# Patient Record
Sex: MALE | Race: BLACK OR AFRICAN AMERICAN | Employment: FULL TIME | URBAN - NONMETROPOLITAN AREA
[De-identification: names, ages, dates, MRNs, and addresses within clinical notes are randomized per-mention and may not be internally consistent; named-entity substitution may affect disease eponyms.]

---

## 2022-01-01 ENCOUNTER — HOSPITAL ENCOUNTER (EMERGENCY)
Age: 42
End: 2022-08-26
Attending: EMERGENCY MEDICINE
Payer: COMMERCIAL

## 2022-01-01 VITALS — DIASTOLIC BLOOD PRESSURE: 39 MMHG | RESPIRATION RATE: 13 BRPM | SYSTOLIC BLOOD PRESSURE: 95 MMHG | HEART RATE: 110 BPM

## 2022-01-01 DIAGNOSIS — I46.9 CARDIAC ARREST (HCC): Primary | ICD-10-CM

## 2022-01-01 PROCEDURE — 74011000250 HC RX REV CODE- 250: Performed by: EMERGENCY MEDICINE

## 2022-01-01 PROCEDURE — 74011250636 HC RX REV CODE- 250/636: Performed by: EMERGENCY MEDICINE

## 2022-01-01 PROCEDURE — 99285 EMERGENCY DEPT VISIT HI MDM: CPT

## 2022-01-01 RX ORDER — EPINEPHRINE 0.1 MG/ML
INJECTION INTRACARDIAC; INTRAVENOUS
Status: COMPLETED | OUTPATIENT
Start: 2022-01-01 | End: 2022-01-01

## 2022-01-01 RX ORDER — EPINEPHRINE 0.1 MG/ML
INJECTION INTRACARDIAC; INTRAVENOUS
Status: DISCONTINUED
Start: 2022-01-01 | End: 2022-01-01 | Stop reason: HOSPADM

## 2022-01-01 RX ORDER — NALOXONE HYDROCHLORIDE 0.4 MG/ML
INJECTION, SOLUTION INTRAMUSCULAR; INTRAVENOUS; SUBCUTANEOUS
Status: COMPLETED | OUTPATIENT
Start: 2022-01-01 | End: 2022-01-01

## 2022-01-01 RX ORDER — CALCIUM CHLORIDE INJECTION 100 MG/ML
INJECTION, SOLUTION INTRAVENOUS
Status: COMPLETED | OUTPATIENT
Start: 2022-01-01 | End: 2022-01-01

## 2022-01-01 RX ORDER — SODIUM BICARBONATE 1 MEQ/ML
SYRINGE (ML) INTRAVENOUS
Status: COMPLETED | OUTPATIENT
Start: 2022-01-01 | End: 2022-01-01

## 2022-01-01 RX ADMIN — EPINEPHRINE 1 MG: 0.1 INJECTION, SOLUTION ENDOTRACHEAL; INTRACARDIAC; INTRAVENOUS at 12:47

## 2022-01-01 RX ADMIN — EPINEPHRINE 1 MG: 0.1 INJECTION, SOLUTION ENDOTRACHEAL; INTRACARDIAC; INTRAVENOUS at 12:35

## 2022-01-01 RX ADMIN — EPINEPHRINE 1 MG: 0.1 INJECTION, SOLUTION ENDOTRACHEAL; INTRACARDIAC; INTRAVENOUS at 12:59

## 2022-01-01 RX ADMIN — CALCIUM CHLORIDE 1 G: 100 INJECTION, SOLUTION INTRAVENOUS at 12:36

## 2022-01-01 RX ADMIN — EPINEPHRINE 1 MG: 0.1 INJECTION, SOLUTION ENDOTRACHEAL; INTRACARDIAC; INTRAVENOUS at 12:56

## 2022-01-01 RX ADMIN — EPINEPHRINE 1 MG: 0.1 INJECTION, SOLUTION ENDOTRACHEAL; INTRACARDIAC; INTRAVENOUS at 12:38

## 2022-01-01 RX ADMIN — SODIUM BICARBONATE 50 MEQ: 84 INJECTION, SOLUTION INTRAVENOUS at 12:53

## 2022-01-01 RX ADMIN — EPINEPHRINE 1 MG: 0.1 INJECTION, SOLUTION ENDOTRACHEAL; INTRACARDIAC; INTRAVENOUS at 12:53

## 2022-01-01 RX ADMIN — EPINEPHRINE 1 MG: 0.1 INJECTION, SOLUTION ENDOTRACHEAL; INTRACARDIAC; INTRAVENOUS at 12:50

## 2022-01-01 RX ADMIN — EPINEPHRINE 1 MG: 0.1 INJECTION, SOLUTION ENDOTRACHEAL; INTRACARDIAC; INTRAVENOUS at 13:02

## 2022-01-01 RX ADMIN — EPINEPHRINE 1 MG: 0.1 INJECTION, SOLUTION ENDOTRACHEAL; INTRACARDIAC; INTRAVENOUS at 12:41

## 2022-01-01 RX ADMIN — NALOXONE HYDROCHLORIDE 2 MG: 0.4 INJECTION, SOLUTION INTRAMUSCULAR; INTRAVENOUS; SUBCUTANEOUS at 12:37

## 2022-01-01 RX ADMIN — EPINEPHRINE 1 MG: 0.1 INJECTION, SOLUTION ENDOTRACHEAL; INTRACARDIAC; INTRAVENOUS at 12:44

## 2022-01-01 RX ADMIN — SODIUM BICARBONATE 50 MEQ: 84 INJECTION, SOLUTION INTRAVENOUS at 12:45

## 2022-01-01 RX ADMIN — NALOXONE HYDROCHLORIDE 10 MG: 0.4 INJECTION, SOLUTION INTRAMUSCULAR; INTRAVENOUS; SUBCUTANEOUS at 12:37

## 2022-08-26 NOTE — PROGRESS NOTES
Spiritual Care Assessment/Progress Note  [facility]      NAME: Antwon Cavanaugh      MRN: [id number]  AGE: 38 y.o. SEX: male  Mosque Affiliation:    Language:      8/26/2022     Total Time (in minutes): 136     Spiritual Assessment begun in [facility] EM[facility]GENCY DEPT through conversation with:         []Patient        [x] Family    [] Friend(s)        Reason for Consult: Death, Inpatient     Spiritual beliefs: (Please include comment if needed)     [x] Identifies with a tiara tradition:         [] Supported by a tiara community:            [] Claims no spiritual orientation:           [] Seeking spiritual identity:                [] Adheres to an individual form of spirituality:           [] Not able to assess:                           Identified resources for coping:      [] Prayer                               [] Music                  [] Guided Imagery     [x] Family/friends                 [] Pet visits     [] Devotional reading                         [] Unknown     [] Other:                                               Interventions offered during this visit: (See comments for more details)    Patient Interventions: Prayer (actual)     Family/Friend(s):  Affirmation of emotions/emotional suffering, Catharsis/review of pertinent events in supportive environment, Iconic (affirming the presence of God/Higher Power), Prayer (assurance of)     Plan of Care:     [] Support spiritual and/or cultural needs    [] Support AMD and/or advance care planning process      [] Support grieving process   [] Coordinate Rites and/or Rituals    [] Coordination with community clergy   [] No spiritual needs identified at this time   [] Detailed Plan of Care below (See Comments)  [] Make referral to Music Therapy  [] Make referral to Pet Therapy     [] Make referral to Addiction services  [] Make referral to [facility]  [] Make referral to Spiritual Care Partner  [x] No future visits requested [] Contact Spiritual Care for further referrals     Comments: Note documented by [facility]. Associate  Gagan Brown responded to call to ER. Mr Robinson Mcrae had .  spent time with family who were very receptive to spiritual care.  coordinated with family and staff as more family arrived.  provided spiritual care to all.   Blake Strauss., MS., [facility]

## 2022-08-26 NOTE — ED NOTES
Pt brought in by EMS at this time in cardiac arrest asystole on the monitor with CPR in process per auto-pulse per EMS they were called for pt being disoriented with a nose bleed upon arrival pt was struggling to breath and shortly after went unresponsive,pt determined  to be in cardiac arrest, resuscitation efforts began around 02) [phone], EMS gave 500 ml bolus, 2 rounds of narcan 2 mg and 7 rounds of epinephrine 1 mg in the field, blood glucose 224 in the field, pt with a size 5 IO in the rt shoulder

## 2022-08-27 NOTE — ED PROVIDER NOTES
EMERGENCY DEPARTMENT HISTORY AND PHYSICAL EXAM      Date: 8/26/2022  Patient Name: Mathieu Garzon    History of Presenting Illness     Chief Complaint   Patient presents with    Cardiac arrest       History Provided By: Patient, EMS, and and family    HPI: Mathieu Garzon, 38 y.o. male with hx of only htn presented to the ed in cardiac arrest. Witnessed by EMS who were called for disorientation and confusion and possible nose bleed. They arrived and patient had apparently just collapsed to the kitchen floor. After Pt SO arrives states pt woke up late this morning and was \"talking out of his head\" no history of etoh, or drug abuse. No new medicaitons or exposure to chemicals. No SI or HI. No complaints of sob, chest pains, abdominal pains or other pains. EMS initated ACLS on scene and in transport a total of 35 minutes in asystole, had received 7 doses of epinephrine in route and had an Igel placed pta. ACLS continued on arrival with additional doses of medicaitons as noted in the [facility]. Given recent particularly strong opioids noted to be in the area 10mg narcan attempted without response. Continued ACLS, withotu perfuseable or shockable rhythm for a total of 1.5 hours total (EMS and ED) without perfuseable rhythm or shockable rhythm as well as no discernasble motion on bedside US. After prolonged attempts to resuscitate, efforts were stopped at 1306. There are no other complaints, changes, or physical findings at this time. PCP: Itz Duarte MD    Past History   Past Medical History:  Past Medical History:   Diagnosis Date    Hypertension        Past Surgical History:  Past Surgical History:   Procedure Laterality Date    HX ORTHOPAEDIC         Family History:  No family history on file. Social History:        Allergies:  Not on File  Review of Systems   Review of Systems   Unable to perform ROS: Unstable vital signs      Physical Exam   Physical Exam  Constitutional:       Appearance: He is ill-appearing. Interventions: He is intubated. HENT:      Head: Normocephalic and atraumatic. Mouth/Throat:      Mouth: Mucous membranes are moist.      Comments: iGel in place, bagging well. No effort for spontaneous breaths. Eyes:      Pupils: Pupils are equal, round, and reactive to light. Pulmonary:      Effort: He is intubated. Comments: Breath sounds bilaterally with bagging. Abdominal:      General: Abdomen is flat. There is no distension. Palpations: Abdomen is soft. Genitourinary:     Penis: Normal.       Testes: Normal.   Skin:     General: Skin is cool and dry. Findings: No lesion. Neurological:      Mental Status: He is unresponsive. Medical Decision Making and ED Course   Differential Diagnosis & Medical Decision Making Provider Note:   As above. Pt arrived in arrest. Unable to obtain ROSC. Medical examiner declined case due to pt history of hypertension. Discussion with SO pt also had hip surgery 1 months prior.     - I am the first and primary provider for this patient. I reviewed the vital signs, available nursing notes, past medical history, past surgical history, family history and social history. The patient's presenting problems have been discussed, and the staff are in agreement with the care plan formulated and outlined with them. I have encouraged them to ask questions as they arise throughout their visit. Vital Signs-Reviewed the patient's vital signs. Patient Vitals for the past 12 hrs:   Pulse Resp BP   08/26/22 1240 (!) 110 13 (!) 95/39          ED Course:           Disposition   Disposition: Condition decesaed      Diagnosis/Clinical Impression     Clinical Impression:   1. Cardiac arrest [facility])        Attestations: Lily Pérez MD, am the primary clinician of record. Please note that this dictation was completed with [employer], the [employer] voice recognition software.   Quite often unanticipated grammatical, syntax, homophones, and other interpretive errors are inadvertently transcribed by the computer software. Please disregard these errors. Please excuse any errors that have escaped final proofreading. Thank you.